# Patient Record
Sex: MALE | Race: WHITE | NOT HISPANIC OR LATINO
[De-identification: names, ages, dates, MRNs, and addresses within clinical notes are randomized per-mention and may not be internally consistent; named-entity substitution may affect disease eponyms.]

---

## 2018-07-16 PROBLEM — M25.561 CHRONIC PAIN OF RIGHT KNEE: Status: ACTIVE | Noted: 2018-07-16

## 2018-07-17 ENCOUNTER — APPOINTMENT (OUTPATIENT)
Dept: ORTHOPEDIC SURGERY | Facility: CLINIC | Age: 58
End: 2018-07-17
Payer: MEDICARE

## 2018-07-17 VITALS
DIASTOLIC BLOOD PRESSURE: 61 MMHG | SYSTOLIC BLOOD PRESSURE: 138 MMHG | HEART RATE: 106 BPM | BODY MASS INDEX: 28.63 KG/M2 | HEIGHT: 70 IN | WEIGHT: 200 LBS

## 2018-07-17 DIAGNOSIS — Z85.9 PERSONAL HISTORY OF MALIGNANT NEOPLASM, UNSPECIFIED: ICD-10-CM

## 2018-07-17 DIAGNOSIS — Z78.9 OTHER SPECIFIED HEALTH STATUS: ICD-10-CM

## 2018-07-17 DIAGNOSIS — M25.561 PAIN IN RIGHT KNEE: ICD-10-CM

## 2018-07-17 DIAGNOSIS — T84.84XA PAIN DUE TO INTERNAL ORTHOPEDIC PROSTHETIC DEVICES, IMPLANTS AND GRAFTS, INITIAL ENCOUNTER: ICD-10-CM

## 2018-07-17 DIAGNOSIS — Z86.69 PERSONAL HISTORY OF OTHER DISEASES OF THE NERVOUS SYSTEM AND SENSE ORGANS: ICD-10-CM

## 2018-07-17 DIAGNOSIS — M25.551 PAIN IN RIGHT HIP: ICD-10-CM

## 2018-07-17 DIAGNOSIS — G89.29 PAIN IN RIGHT KNEE: ICD-10-CM

## 2018-07-17 DIAGNOSIS — Z87.891 PERSONAL HISTORY OF NICOTINE DEPENDENCE: ICD-10-CM

## 2018-07-17 DIAGNOSIS — Z80.9 FAMILY HISTORY OF MALIGNANT NEOPLASM, UNSPECIFIED: ICD-10-CM

## 2018-07-17 DIAGNOSIS — Z86.73 PERSONAL HISTORY OF TRANSIENT ISCHEMIC ATTACK (TIA), AND CEREBRAL INFARCTION W/OUT RESIDUAL DEFICITS: ICD-10-CM

## 2018-07-17 DIAGNOSIS — Z82.61 FAMILY HISTORY OF ARTHRITIS: ICD-10-CM

## 2018-07-17 DIAGNOSIS — Z87.39 PERSONAL HISTORY OF OTHER DISEASES OF THE MUSCULOSKELETAL SYSTEM AND CONNECTIVE TISSUE: ICD-10-CM

## 2018-07-17 DIAGNOSIS — Z96.651 PAIN DUE TO INTERNAL ORTHOPEDIC PROSTHETIC DEVICES, IMPLANTS AND GRAFTS, INITIAL ENCOUNTER: ICD-10-CM

## 2018-07-17 DIAGNOSIS — Z86.39 PERSONAL HISTORY OF OTHER ENDOCRINE, NUTRITIONAL AND METABOLIC DISEASE: ICD-10-CM

## 2018-07-17 PROCEDURE — 73502 X-RAY EXAM HIP UNI 2-3 VIEWS: CPT | Mod: RT

## 2018-07-17 PROCEDURE — 73564 X-RAY EXAM KNEE 4 OR MORE: CPT | Mod: RT

## 2018-07-17 PROCEDURE — 99204 OFFICE O/P NEW MOD 45 MIN: CPT

## 2018-07-17 RX ORDER — OXYCODONE HYDROCHLORIDE AND ACETAMINOPHEN 10; 325 MG/1; MG/1
10-325 TABLET ORAL
Refills: 0 | Status: ACTIVE | COMMUNITY

## 2018-07-17 RX ORDER — INSULIN GLARGINE 100 [IU]/ML
INJECTION, SOLUTION SUBCUTANEOUS
Refills: 0 | Status: ACTIVE | COMMUNITY

## 2018-07-30 ENCOUNTER — CHART COPY (OUTPATIENT)
Age: 58
End: 2018-07-30

## 2018-07-30 ENCOUNTER — APPOINTMENT (OUTPATIENT)
Dept: ORTHOPEDIC SURGERY | Facility: CLINIC | Age: 58
End: 2018-07-30

## 2018-07-30 LAB
CRP SERPL-MCNC: 0.17 MG/DL
ERYTHROCYTE [SEDIMENTATION RATE] IN BLOOD BY WESTERGREN METHOD: 13 MM/HR

## 2018-12-08 ENCOUNTER — EMERGENCY (EMERGENCY)
Facility: HOSPITAL | Age: 58
LOS: 1 days | Discharge: HOME | End: 2018-12-08
Attending: EMERGENCY MEDICINE
Payer: MEDICARE

## 2018-12-08 VITALS
SYSTOLIC BLOOD PRESSURE: 133 MMHG | RESPIRATION RATE: 20 BRPM | DIASTOLIC BLOOD PRESSURE: 78 MMHG | OXYGEN SATURATION: 98 % | HEART RATE: 76 BPM | TEMPERATURE: 98 F

## 2018-12-08 VITALS
DIASTOLIC BLOOD PRESSURE: 82 MMHG | HEART RATE: 86 BPM | OXYGEN SATURATION: 98 % | RESPIRATION RATE: 18 BRPM | SYSTOLIC BLOOD PRESSURE: 149 MMHG | TEMPERATURE: 98 F

## 2018-12-08 DIAGNOSIS — M22.3X1 OTHER DERANGEMENTS OF PATELLA, RIGHT KNEE: Chronic | ICD-10-CM

## 2018-12-08 PROCEDURE — 73562 X-RAY EXAM OF KNEE 3: CPT | Mod: 26,RT

## 2018-12-08 PROCEDURE — 73562 X-RAY EXAM OF KNEE 3: CPT

## 2018-12-08 PROCEDURE — 73600 X-RAY EXAM OF ANKLE: CPT | Mod: 26,RT

## 2018-12-08 PROCEDURE — 73610 X-RAY EXAM OF ANKLE: CPT | Mod: 26,RT

## 2018-12-08 PROCEDURE — 99284 EMERGENCY DEPT VISIT MOD MDM: CPT

## 2018-12-08 PROCEDURE — 73610 X-RAY EXAM OF ANKLE: CPT

## 2018-12-08 PROCEDURE — 96372 THER/PROPH/DIAG INJ SC/IM: CPT

## 2018-12-08 PROCEDURE — 99284 EMERGENCY DEPT VISIT MOD MDM: CPT | Mod: 25

## 2018-12-08 PROCEDURE — 73590 X-RAY EXAM OF LOWER LEG: CPT | Mod: 26,RT

## 2018-12-08 PROCEDURE — 73590 X-RAY EXAM OF LOWER LEG: CPT

## 2018-12-08 PROCEDURE — 73600 X-RAY EXAM OF ANKLE: CPT

## 2018-12-08 RX ORDER — OXYCODONE AND ACETAMINOPHEN 5; 325 MG/1; MG/1
1 TABLET ORAL ONCE
Qty: 0 | Refills: 0 | Status: DISCONTINUED | OUTPATIENT
Start: 2018-12-08 | End: 2018-12-08

## 2018-12-08 RX ORDER — BACLOFEN 100 %
1 POWDER (GRAM) MISCELLANEOUS
Qty: 0 | Refills: 0 | COMMUNITY

## 2018-12-08 RX ORDER — HYDROMORPHONE HYDROCHLORIDE 2 MG/ML
1 INJECTION INTRAMUSCULAR; INTRAVENOUS; SUBCUTANEOUS ONCE
Qty: 0 | Refills: 0 | Status: DISCONTINUED | OUTPATIENT
Start: 2018-12-08 | End: 2018-12-08

## 2018-12-08 RX ORDER — NATALIZUMAB 300 MG/15ML
0 INJECTION INTRAVENOUS
Qty: 0 | Refills: 0 | COMMUNITY

## 2018-12-08 RX ORDER — ALPRAZOLAM 0.25 MG
1 TABLET ORAL
Qty: 0 | Refills: 0 | COMMUNITY

## 2018-12-08 RX ORDER — ZOLPIDEM TARTRATE 10 MG/1
10 TABLET ORAL
Qty: 0 | Refills: 0 | COMMUNITY

## 2018-12-08 RX ORDER — DIPHENHYDRAMINE HCL 50 MG
0 CAPSULE ORAL
Qty: 0 | Refills: 0 | COMMUNITY

## 2018-12-08 RX ADMIN — OXYCODONE AND ACETAMINOPHEN 1 TABLET(S): 5; 325 TABLET ORAL at 11:36

## 2018-12-08 RX ADMIN — HYDROMORPHONE HYDROCHLORIDE 1 MILLIGRAM(S): 2 INJECTION INTRAMUSCULAR; INTRAVENOUS; SUBCUTANEOUS at 11:36

## 2018-12-08 RX ADMIN — OXYCODONE AND ACETAMINOPHEN 1 TABLET(S): 5; 325 TABLET ORAL at 11:37

## 2018-12-08 NOTE — ED PROVIDER NOTE - NEUROLOGICAL, MLM
Alert and oriented, no focal deficits, no motor or sensory deficits. RLE with chronic decreased but intact sensation, unchanged from baseline.

## 2018-12-08 NOTE — ED ADULT TRIAGE NOTE - CHIEF COMPLAINT QUOTE
Pt BIBA, c/o right knee pain s/p fall. Reports he missed 1 step and fell on his right knee, pt reports 4 previous right knee replacements and a knee cap replacement. Pt unable to ambulate, uses walker at home. Denies hitting head, LOC or blood thinners.

## 2018-12-08 NOTE — ED ADULT NURSE REASSESSMENT NOTE - NS ED NURSE REASSESS COMMENT FT1
pt tolerated po fluids and ate a meal without difficulty. pt states that he is ready to go home. Dr Bledsoe at the bedside spoke with patient and family about x ray results and follow up. pt was able to stand briefly. pt discharged via wheel cjair

## 2018-12-08 NOTE — ED PROVIDER NOTE - ATTENDING CONTRIBUTION TO CARE
The patient seen and examined.  The patient presents with right knee pain after a mechanical fall  The patient walks minimally with walker and on wheelchair most of the time    Knee contusion    I, Melquiades Bledsoe, performed the initial face to face bedside interview with this patient regarding history of present illness, review of symptoms and relevant past medical, social and family history.  I completed an independent physical examination.  I was the initial provider who evaluated this patient. I have signed out the follow up of any pending tests (i.e. labs, radiological studies) to the ACP.  I have communicated the patient’s plan of care and disposition with the ACP.

## 2018-12-08 NOTE — ED PROVIDER NOTE - OBJECTIVE STATEMENT
58M with PMHx DM, HTN, MS, R knee replacement x 3, legally blind presents for R knee pain s/p mechanical fall this morning. Patient reports he was leaving his doctor's appointment this morning at around 0845 when he missed a step and fell onto his right side. Patient denies hitting his head, LOC, blood thinner use. Patient states his right knee, leg and ankle are in pain. Denies fever/chills, chest pain, shortness of breath, abdominal pain, nausea/vomiting, leg swelling, numbness/tingling, weakness.

## 2018-12-08 NOTE — ED ADULT NURSE NOTE - PSH
History of Appendectomy    History of Cataract Extraction    History of Cholecystectomy    History of Laminectomy    History of Total Knee Replacement  right x 4  Personal History of Gastric Bypass    Slipped patella of right knee

## 2018-12-08 NOTE — ED ADULT NURSE NOTE - PMH
CVA (cerebral infarction)    Diabetes    History of Multiple Sclerosis    Legally blind    Pneumonia  nov 2010, fungal, required intubation and thoracotomy  Syncope and Collapse  frequent syncopal episodes

## 2018-12-08 NOTE — ED ADULT NURSE NOTE - OBJECTIVE STATEMENT
pt states that he was leaving a doctors office and fell onto his right knee and right side. pt denies hitting his head. pt c/o pain to the right knee and ankle pt has has a old right knee wound which he opened with fall.

## 2018-12-08 NOTE — ED ADULT NURSE NOTE - NSIMPLEMENTINTERV_GEN_ALL_ED
Implemented All Fall with Harm Risk Interventions:  [place] to call system. Call bell, personal items and telephone within reach. Instruct patient to call for assistance. Room bathroom lighting operational. Non-slip footwear when patient is off stretcher. Physically safe environment: no spills, clutter or unnecessary equipment. Stretcher in lowest position, wheels locked, appropriate side rails in place. Provide visual cue, wrist band, yellow gown, etc. Monitor gait and stability. Monitor for mental status changes and reorient to person, place, and time. Review medications for side effects contributing to fall risk. Reinforce activity limits and safety measures with patient and family. Provide visual clues: red socks.

## 2019-07-16 PROBLEM — E11.9 TYPE 2 DIABETES MELLITUS WITHOUT COMPLICATIONS: Chronic | Status: ACTIVE | Noted: 2018-12-08

## 2019-07-16 PROBLEM — H54.8 LEGAL BLINDNESS, AS DEFINED IN USA: Chronic | Status: ACTIVE | Noted: 2018-12-08

## 2019-07-17 ENCOUNTER — APPOINTMENT (OUTPATIENT)
Dept: ORTHOPEDIC SURGERY | Facility: CLINIC | Age: 59
End: 2019-07-17

## 2021-09-02 ENCOUNTER — APPOINTMENT (OUTPATIENT)
Dept: CARDIOLOGY | Facility: CLINIC | Age: 61
End: 2021-09-02
Payer: MEDICARE

## 2021-09-02 ENCOUNTER — NON-APPOINTMENT (OUTPATIENT)
Age: 61
End: 2021-09-02

## 2021-09-02 VITALS — SYSTOLIC BLOOD PRESSURE: 104 MMHG | DIASTOLIC BLOOD PRESSURE: 54 MMHG

## 2021-09-02 VITALS
SYSTOLIC BLOOD PRESSURE: 100 MMHG | OXYGEN SATURATION: 97 % | HEART RATE: 87 BPM | DIASTOLIC BLOOD PRESSURE: 58 MMHG | WEIGHT: 187 LBS | TEMPERATURE: 96.7 F | HEIGHT: 70 IN | RESPIRATION RATE: 16 BRPM | BODY MASS INDEX: 26.77 KG/M2

## 2021-09-02 DIAGNOSIS — Z80.8 FAMILY HISTORY OF MALIGNANT NEOPLASM OF OTHER ORGANS OR SYSTEMS: ICD-10-CM

## 2021-09-02 DIAGNOSIS — M54.2 CERVICALGIA: ICD-10-CM

## 2021-09-02 DIAGNOSIS — Z78.9 OTHER SPECIFIED HEALTH STATUS: ICD-10-CM

## 2021-09-02 DIAGNOSIS — G35 MULTIPLE SCLEROSIS: ICD-10-CM

## 2021-09-02 DIAGNOSIS — K76.0 FATTY (CHANGE OF) LIVER, NOT ELSEWHERE CLASSIFIED: ICD-10-CM

## 2021-09-02 DIAGNOSIS — Z83.3 FAMILY HISTORY OF DIABETES MELLITUS: ICD-10-CM

## 2021-09-02 DIAGNOSIS — Z99.3 DEPENDENCE ON WHEELCHAIR: ICD-10-CM

## 2021-09-02 DIAGNOSIS — H54.8 LEGAL BLINDNESS, AS DEFINED IN USA: ICD-10-CM

## 2021-09-02 DIAGNOSIS — R10.9 UNSPECIFIED ABDOMINAL PAIN: ICD-10-CM

## 2021-09-02 DIAGNOSIS — Z86.69 PERSONAL HISTORY OF OTHER DISEASES OF THE NERVOUS SYSTEM AND SENSE ORGANS: ICD-10-CM

## 2021-09-02 DIAGNOSIS — J18.9 PNEUMONIA, UNSPECIFIED ORGANISM: ICD-10-CM

## 2021-09-02 DIAGNOSIS — E78.1 PURE HYPERGLYCERIDEMIA: ICD-10-CM

## 2021-09-02 DIAGNOSIS — K86.89 OTHER SPECIFIED DISEASES OF PANCREAS: ICD-10-CM

## 2021-09-02 DIAGNOSIS — E11.9 TYPE 2 DIABETES MELLITUS W/OUT COMPLICATIONS: ICD-10-CM

## 2021-09-02 PROCEDURE — 93000 ELECTROCARDIOGRAM COMPLETE: CPT

## 2021-09-02 PROCEDURE — 99204 OFFICE O/P NEW MOD 45 MIN: CPT | Mod: 25

## 2021-09-02 RX ORDER — EMPAGLIFLOZIN 10 MG/1
10 TABLET, FILM COATED ORAL
Refills: 0 | Status: DISCONTINUED | COMMUNITY
End: 2021-09-02

## 2021-09-02 NOTE — PHYSICAL EXAM
[Normal] : clear lung fields, good air entry, no respiratory distress [No Edema] : no edema [No Cyanosis] : no cyanosis [No Clubbing] : no clubbing [Moves all extremities] : moves all extremities [Normal Speech] : normal speech [de-identified] : No JVD, no carotid artery bruits auscultated bilaterally [de-identified] : wheelchair

## 2021-09-02 NOTE — HISTORY OF PRESENT ILLNESS
[FreeTextEntry1] : 61 year old male, history of DM, MS, neuropathy, carotid artery disease, CAD, ischemic CVAs, syncope s/p ILR many years ago (battery now dead) and ILR is retained, presents to establish care with cardiology. Patient has no chest pain or SOB, however not very active. He had a recent PET scan which showed coronary artery disease. Imaging of his brain showed multiple old ischemic infarcts and CTA of the carotid arteries showed non-obstructive disease. \par \par There is no clinical history of MI, previous coronary intervention.

## 2021-09-02 NOTE — DISCUSSION/SUMMARY
[FreeTextEntry1] : 1. Coronary Artery Disease: found incidentally on PET scan. Discussed the possibility of silent ischemia given his neuropathy and diabetes. Patient adamant that he doesn't want nuclear stress testing. He has a iodine allergy (reports anaphylaxis, although CTA of the head and neck done at [facility] was done with iodine). At this point in time, recommend starting Aspirin 81mg daily and atorvastatin 20mg daily.\par \par 2. Carotid Artery Disease: non-obstructive on recent CTA of the head neck. Aspirin and statin indicated. \par \par 3. Hx of CVAs: medical therapy as above. \par \par 4. History of Syncope: ILR placed many years ago by Dr. Hadley. ILR battery now depleted. No events on ILR reported as per patient and wife. Syncope thought to be due more to MS. Neurology follow up. \par \par Follow up in 6 months.

## 2021-09-02 NOTE — REVIEW OF SYSTEMS
[Fever] : no fever [Chills] : no chills [Feeling Fatigued] : feeling fatigued [Joint Pain] : joint pain [Joint Stiffness] : joint stiffness [Myalgia] : myalgia [Negative] : Gastrointestinal [FreeTextEntry5] : see HPI [FreeTextEntry6] : see HPI [de-identified] : see HPI

## 2021-09-02 NOTE — CARDIOLOGY SUMMARY
[de-identified] : 09/2/2021, NSR with PVCs, low voltages and non-specific T wave changes. [de-identified] : 6/4/2021, LV EF 65%, no significant valvular disease.

## 2022-01-21 ENCOUNTER — APPOINTMENT (OUTPATIENT)
Dept: ENDOCRINOLOGY | Facility: CLINIC | Age: 62
End: 2022-01-21

## 2022-02-09 DIAGNOSIS — M25.551 PAIN IN RIGHT HIP: ICD-10-CM

## 2022-02-15 ENCOUNTER — APPOINTMENT (OUTPATIENT)
Dept: ORTHOPEDIC SURGERY | Facility: CLINIC | Age: 62
End: 2022-02-15

## 2022-03-02 ENCOUNTER — NON-APPOINTMENT (OUTPATIENT)
Age: 62
End: 2022-03-02

## 2022-03-02 ENCOUNTER — APPOINTMENT (OUTPATIENT)
Dept: CARDIOLOGY | Facility: CLINIC | Age: 62
End: 2022-03-02
Payer: MEDICARE

## 2022-03-02 VITALS
SYSTOLIC BLOOD PRESSURE: 102 MMHG | DIASTOLIC BLOOD PRESSURE: 56 MMHG | OXYGEN SATURATION: 95 % | HEIGHT: 70 IN | HEART RATE: 80 BPM

## 2022-03-02 PROCEDURE — 99214 OFFICE O/P EST MOD 30 MIN: CPT | Mod: 25

## 2022-03-02 PROCEDURE — 93000 ELECTROCARDIOGRAM COMPLETE: CPT

## 2022-03-02 RX ORDER — ASPIRIN ENTERIC COATED TABLETS 81 MG 81 MG/1
81 TABLET, DELAYED RELEASE ORAL
Refills: 0 | Status: ACTIVE | COMMUNITY
Start: 2022-03-02

## 2022-03-02 RX ORDER — ASPIRIN ENTERIC COATED TABLETS 81 MG 81 MG/1
81 TABLET, DELAYED RELEASE ORAL
Refills: 0 | Status: DISCONTINUED | COMMUNITY
Start: 2021-09-02 | End: 2022-03-02

## 2022-03-02 RX ORDER — ATORVASTATIN CALCIUM 20 MG/1
20 TABLET, FILM COATED ORAL
Qty: 90 | Refills: 3 | Status: DISCONTINUED | COMMUNITY
Start: 2021-09-02 | End: 2022-03-02

## 2022-03-02 NOTE — CARDIOLOGY SUMMARY
[de-identified] : 03/02/2022, NSR with PVCs, low voltages and non-specific T wave changes. [de-identified] : 6/4/2021, LV EF 65%, no significant valvular disease.

## 2022-03-02 NOTE — REVIEW OF SYSTEMS
[Fever] : no fever [Chills] : no chills [Feeling Fatigued] : feeling fatigued [Joint Pain] : joint pain [Joint Stiffness] : joint stiffness [Myalgia] : myalgia [Negative] : Gastrointestinal [FreeTextEntry5] : see HPI [FreeTextEntry6] : see HPI [de-identified] : see HPI

## 2022-03-02 NOTE — PHYSICAL EXAM
[Normal] : clear lung fields, good air entry, no respiratory distress [No Edema] : no edema [No Cyanosis] : no cyanosis [No Clubbing] : no clubbing [Moves all extremities] : moves all extremities [Normal Speech] : normal speech [de-identified] : no carotid artery bruits auscultated bilaterally [de-identified] : wheelchair

## 2022-03-02 NOTE — DISCUSSION/SUMMARY
[FreeTextEntry1] : 1. Coronary Artery Disease: found incidentally on PET scan. Discussed the possibility of silent ischemia given his neuropathy and diabetes. Patient adamant that he doesn't want nuclear stress testing. He has a iodine allergy (reports anaphylaxis, although CTA of the head and neck done at [facility] was done with iodine). At this point in time, recommend restarting Aspirin 81mg daily and atorvastatin 20mg daily.\par \par 2. Carotid Artery Disease: non-obstructive on recent CTA of the head neck. Aspirin and statin indicated. \par \par 3. Hx of CVAs: medical therapy as above. \par \par 4. History of Syncope: ILR placed many years ago by Dr. Hadley. ILR battery now depleted. No events on ILR reported as per patient and wife. Syncope thought to be due more to MS. Neurology follow up. \par \par Follow up in 6 months.

## 2022-09-16 ENCOUNTER — NON-APPOINTMENT (OUTPATIENT)
Age: 62
End: 2022-09-16

## 2022-09-16 ENCOUNTER — APPOINTMENT (OUTPATIENT)
Dept: CARDIOLOGY | Facility: CLINIC | Age: 62
End: 2022-09-16

## 2022-09-16 VITALS
SYSTOLIC BLOOD PRESSURE: 100 MMHG | HEART RATE: 93 BPM | OXYGEN SATURATION: 95 % | HEIGHT: 70 IN | DIASTOLIC BLOOD PRESSURE: 58 MMHG

## 2022-09-16 PROCEDURE — 99214 OFFICE O/P EST MOD 30 MIN: CPT | Mod: 25

## 2022-09-16 PROCEDURE — 93000 ELECTROCARDIOGRAM COMPLETE: CPT

## 2022-09-16 RX ORDER — INSULIN LISPRO 100 [IU]/ML
100 INJECTION, SOLUTION INTRAVENOUS; SUBCUTANEOUS
Refills: 0 | Status: ACTIVE | COMMUNITY

## 2022-09-16 NOTE — CARDIOLOGY SUMMARY
[de-identified] : 9/16/2022, NSR, low voltages [de-identified] : 6/4/2021, LV EF 65%, no significant valvular disease.

## 2022-09-16 NOTE — PHYSICAL EXAM
[Normal] : no edema, no cyanosis, no clubbing, no varicosities [Moves all extremities] : moves all extremities [Normal Speech] : normal speech [de-identified] : no carotid artery bruits auscultated bilaterally [de-identified] : wheelchair

## 2022-09-16 NOTE — HISTORY OF PRESENT ILLNESS
[FreeTextEntry1] : Historical Perspective:\par 61 year old male, history of DM, MS, neuropathy, carotid artery disease, CAD, ischemic CVAs, syncope s/p ILR many years ago (battery now dead) and ILR is retained, presents to establish care with cardiology. Patient has no chest pain or SOB, however not very active. He had a recent PET scan which showed coronary artery disease. Imaging of his brain showed multiple old ischemic infarcts and CTA of the carotid arteries showed non-obstructive disease. \par \par There is no clinical history of MI, previous coronary intervention.\par \par Current Health Status:\par Patient with no chest pain, SOB, or palpitations. No hospitalizations since seeing me last. Remains compliant with his medications and reports no adverse effects. Patient complaining of dysphagia.

## 2022-09-16 NOTE — DISCUSSION/SUMMARY
[FreeTextEntry1] : 1. Coronary Artery Disease: found incidentally on PET scan. Discussed the possibility of silent ischemia given his neuropathy and diabetes. Patient adamant that he doesn't want nuclear stress testing. He has a iodine allergy (reports anaphylaxis, although CTA of the head and neck done at [facility] was done with iodine). At this point in time, recommend restarting Aspirin 81mg daily and atorvastatin 20mg daily.\par \par 2. Carotid Artery Disease: non-obstructive on recent CTA of the head neck. Aspirin and statin indicated. \par \par 3. Hx of CVAs: medical therapy as above. \par \par 4. History of Syncope: ILR placed many years ago by Dr. Hadley. ILR battery now depleted. No events on ILR reported as per patient and wife. Syncope thought to be due more to MS. Neurology follow up. \par \par Provided patient with GI, Dr. Arlette Carrizales's office information. \par \par Follow up in 12 months. [EKG obtained to assist in diagnosis and management of assessed problem(s)] : EKG obtained to assist in diagnosis and management of assessed problem(s)

## 2022-09-16 NOTE — REVIEW OF SYSTEMS
[Fever] : no fever [Chills] : no chills [Feeling Fatigued] : feeling fatigued [Joint Pain] : joint pain [Joint Stiffness] : joint stiffness [Myalgia] : myalgia [Negative] : Gastrointestinal [FreeTextEntry5] : see HPI [FreeTextEntry6] : see HPI [de-identified] : see HPI

## 2022-09-30 ENCOUNTER — TRANSCRIPTION ENCOUNTER (OUTPATIENT)
Age: 62
End: 2022-09-30

## 2022-10-17 ENCOUNTER — APPOINTMENT (OUTPATIENT)
Dept: CARDIOLOGY | Facility: CLINIC | Age: 62
End: 2022-10-17

## 2022-10-17 ENCOUNTER — NON-APPOINTMENT (OUTPATIENT)
Age: 62
End: 2022-10-17

## 2022-10-17 VITALS
WEIGHT: 173 LBS | SYSTOLIC BLOOD PRESSURE: 102 MMHG | BODY MASS INDEX: 24.77 KG/M2 | OXYGEN SATURATION: 95 % | HEART RATE: 82 BPM | HEIGHT: 70 IN | TEMPERATURE: 97.1 F | DIASTOLIC BLOOD PRESSURE: 60 MMHG

## 2022-10-17 PROCEDURE — 99214 OFFICE O/P EST MOD 30 MIN: CPT

## 2022-10-17 RX ORDER — ALPRAZOLAM 0.25 MG/1
0.25 TABLET ORAL
Refills: 0 | Status: DISCONTINUED | COMMUNITY
End: 2022-10-17

## 2022-10-17 RX ORDER — ZOLPIDEM TARTRATE 5 MG/1
5 TABLET, FILM COATED ORAL
Refills: 0 | Status: DISCONTINUED | COMMUNITY
End: 2022-10-17

## 2022-10-17 RX ORDER — ALPRAZOLAM 1 MG/1
1 TABLET ORAL
Qty: 120 | Refills: 0 | Status: ACTIVE | COMMUNITY
Start: 2022-10-07

## 2022-10-17 RX ORDER — ZOLPIDEM TARTRATE 10 MG/1
10 TABLET ORAL
Qty: 30 | Refills: 0 | Status: ACTIVE | COMMUNITY
Start: 2022-09-30

## 2022-10-19 ENCOUNTER — NON-APPOINTMENT (OUTPATIENT)
Age: 62
End: 2022-10-19

## 2022-12-06 DIAGNOSIS — Z00.00 ENCOUNTER FOR GENERAL ADULT MEDICAL EXAMINATION W/OUT ABNORMAL FINDINGS: ICD-10-CM

## 2022-12-07 ENCOUNTER — APPOINTMENT (OUTPATIENT)
Dept: ORTHOPEDIC SURGERY | Facility: CLINIC | Age: 62
End: 2022-12-07

## 2023-02-09 ENCOUNTER — APPOINTMENT (OUTPATIENT)
Dept: CARDIOLOGY | Facility: CLINIC | Age: 63
End: 2023-02-09
Payer: MEDICARE

## 2023-02-09 PROCEDURE — 93248 EXT ECG>7D<15D REV&INTERPJ: CPT

## 2023-04-18 ENCOUNTER — APPOINTMENT (OUTPATIENT)
Dept: CARDIOLOGY | Facility: CLINIC | Age: 63
End: 2023-04-18
Payer: MEDICARE

## 2023-04-18 VITALS
BODY MASS INDEX: 23.49 KG/M2 | HEIGHT: 68 IN | DIASTOLIC BLOOD PRESSURE: 54 MMHG | OXYGEN SATURATION: 97 % | HEART RATE: 86 BPM | WEIGHT: 155 LBS | SYSTOLIC BLOOD PRESSURE: 96 MMHG

## 2023-04-18 DIAGNOSIS — R07.89 OTHER CHEST PAIN: ICD-10-CM

## 2023-04-18 DIAGNOSIS — Z87.898 PERSONAL HISTORY OF OTHER SPECIFIED CONDITIONS: ICD-10-CM

## 2023-04-18 PROCEDURE — 99214 OFFICE O/P EST MOD 30 MIN: CPT

## 2023-04-18 NOTE — DISCUSSION/SUMMARY
[FreeTextEntry1] : 1. Coronary Artery Disease: underwent cardiac catheterization, October 2022. Non-obstructive disease. Continue atorvastatin 40mg daily. Better DM control. \par \par 2. Carotid Artery Disease: non-obstructive on recent CTA of the head neck. Continue atorvastatin 40mg daily.  \par \par 3. Hx of CVAs: medical therapy as above. \par \par 4. History of Syncope: ILR placed many years ago by Dr. Hadley. ILR battery now depleted. No events on ILR reported as per patient and wife. Syncope thought to be due more to MS. Neurology follow up. Wants ILR removed because having issues getting MRIs done. Will refer to EP for removal. \par \par Follow up in September 2023.

## 2023-04-18 NOTE — CARDIOLOGY SUMMARY
[de-identified] : 9/16/2022, NSR, low voltages [de-identified] : 6/4/2021, LV EF 65%, no significant valvular disease. [de-identified] : 10/13/2022, 50% mid LAD, 40% distal LAD, 40% OM1 (no interventions)

## 2023-04-18 NOTE — HISTORY OF PRESENT ILLNESS
[FreeTextEntry1] : Historical Perspective:\par 63 year old male, history of DM, MS, neuropathy, carotid artery disease, CAD, ischemic CVAs, syncope s/p ILR many years ago (battery now dead) and ILR is retained, presents to establish care with cardiology. Patient has no chest pain or SOB, however not very active. He had a recent PET scan which showed coronary artery disease. Imaging of his brain showed multiple old ischemic infarcts and CTA of the carotid arteries showed non-obstructive disease. \par \par There is no clinical history of MI, previous coronary intervention.\par \par Current Health Status:\par Recent hospitalization at [facility] for MS flair. Following with neurology. Wants ILR removed because having issues with MRIs. Also pending insulin pump.

## 2023-04-18 NOTE — REVIEW OF SYSTEMS
[Fever] : no fever [Chills] : no chills [Joint Pain] : joint pain [Feeling Fatigued] : feeling fatigued [Joint Stiffness] : joint stiffness [Myalgia] : myalgia [Negative] : Gastrointestinal [FreeTextEntry5] : see HPI [FreeTextEntry6] : see HPI [de-identified] : see HPI

## 2023-05-05 ENCOUNTER — NON-APPOINTMENT (OUTPATIENT)
Age: 63
End: 2023-05-05

## 2023-05-17 ENCOUNTER — APPOINTMENT (OUTPATIENT)
Dept: CARDIOLOGY | Facility: CLINIC | Age: 63
End: 2023-05-17

## 2023-09-13 ENCOUNTER — APPOINTMENT (OUTPATIENT)
Dept: CARDIOLOGY | Facility: CLINIC | Age: 63
End: 2023-09-13

## 2023-12-08 RX ORDER — ATORVASTATIN CALCIUM 40 MG/1
40 TABLET, FILM COATED ORAL
Qty: 90 | Refills: 3 | Status: ACTIVE | COMMUNITY
Start: 2022-03-02 | End: 1900-01-01

## 2023-12-08 RX ORDER — DOXYCYCLINE 100 MG/1
100 CAPSULE ORAL
Refills: 0 | Status: DISCONTINUED | COMMUNITY
End: 2023-12-08

## 2023-12-22 ENCOUNTER — APPOINTMENT (OUTPATIENT)
Dept: CARDIOLOGY | Facility: CLINIC | Age: 63
End: 2023-12-22
Payer: MEDICARE

## 2023-12-22 VITALS
HEART RATE: 85 BPM | BODY MASS INDEX: 22.28 KG/M2 | OXYGEN SATURATION: 97 % | DIASTOLIC BLOOD PRESSURE: 56 MMHG | SYSTOLIC BLOOD PRESSURE: 110 MMHG | HEIGHT: 68 IN | WEIGHT: 147 LBS

## 2023-12-22 DIAGNOSIS — I25.10 ATHEROSCLEROTIC HEART DISEASE OF NATIVE CORONARY ARTERY W/OUT ANGINA PECTORIS: ICD-10-CM

## 2023-12-22 DIAGNOSIS — I63.9 CEREBRAL INFARCTION, UNSPECIFIED: ICD-10-CM

## 2023-12-22 DIAGNOSIS — I77.9 DISORDER OF ARTERIES AND ARTERIOLES, UNSPECIFIED: ICD-10-CM

## 2023-12-22 PROCEDURE — 99214 OFFICE O/P EST MOD 30 MIN: CPT

## 2023-12-22 NOTE — CARDIOLOGY SUMMARY
[de-identified] : 9/16/2022, NSR, low voltages [de-identified] : 6/4/2021, LV EF 65%, no significant valvular disease. [de-identified] : 10/13/2022, 50% mid LAD, 40% distal LAD, 40% OM1 (no interventions)

## 2023-12-22 NOTE — PHYSICAL EXAM
[Normal] : clear lung fields, good air entry, no respiratory distress [Moves all extremities] : moves all extremities [Normal Speech] : normal speech [de-identified] : no carotid artery bruits auscultated bilaterally [de-identified] : wheelchair [de-identified] : above the knee amputaton of right lower extremity.

## 2023-12-22 NOTE — DISCUSSION/SUMMARY
[FreeTextEntry1] : 1. Coronary Artery Disease: underwent cardiac catheterization, October 2022. Non-obstructive disease. Continue atorvastatin 40mg daily. Better DM control.   2. Carotid Artery Disease: non-obstructive on recent CTA of the head neck. Continue atorvastatin 40mg daily.    3. Hx of CVAs: medical therapy as above.   4. History of Syncope: ILR placed many years ago by Dr. Hadley. ILR battery now depleted. No events on ILR reported as per patient and wife. Syncope thought to be due more to MS. Neurology follow up. LR explanted  Follow up in 1 year.

## 2023-12-22 NOTE — HISTORY OF PRESENT ILLNESS
[FreeTextEntry1] : Historical Perspective: 63 year old male, history of DM, MS, neuropathy, carotid artery disease, CAD, ischemic CVAs, syncope s/p ILR many years ago (battery now dead) and ILR is retained, presents to establish care with cardiology. Patient has no chest pain or SOB, however not very active. He had a recent PET scan which showed coronary artery disease. Imaging of his brain showed multiple old ischemic infarcts and CTA of the carotid arteries showed non-obstructive disease.   There is no clinical history of MI, previous coronary intervention.  Current Health Status: Recent hospitalization at [facility] for unresponsive episode at home. No significant findings during hospitalization on echocardiogram or MRI of brain. Patient also on above the knee amputation of right lower extremity, 7/2023, because of a MRSA infection.

## 2024-07-03 ENCOUNTER — APPOINTMENT (OUTPATIENT)
Dept: CARDIOLOGY | Facility: CLINIC | Age: 64
End: 2024-07-03
Payer: MEDICARE

## 2024-07-03 ENCOUNTER — NON-APPOINTMENT (OUTPATIENT)
Age: 64
End: 2024-07-03

## 2024-07-03 VITALS
BODY MASS INDEX: 19.4 KG/M2 | WEIGHT: 128 LBS | HEIGHT: 68 IN | DIASTOLIC BLOOD PRESSURE: 54 MMHG | HEART RATE: 90 BPM | OXYGEN SATURATION: 97 % | SYSTOLIC BLOOD PRESSURE: 90 MMHG

## 2024-07-03 DIAGNOSIS — I25.10 ATHEROSCLEROTIC HEART DISEASE OF NATIVE CORONARY ARTERY W/OUT ANGINA PECTORIS: ICD-10-CM

## 2024-07-03 DIAGNOSIS — R55 SYNCOPE AND COLLAPSE: ICD-10-CM

## 2024-07-03 DIAGNOSIS — I63.9 CEREBRAL INFARCTION, UNSPECIFIED: ICD-10-CM

## 2024-07-03 PROCEDURE — 93000 ELECTROCARDIOGRAM COMPLETE: CPT

## 2024-07-03 PROCEDURE — G2211 COMPLEX E/M VISIT ADD ON: CPT

## 2024-07-03 PROCEDURE — 99214 OFFICE O/P EST MOD 30 MIN: CPT

## 2024-07-03 RX ORDER — TIZANIDINE HYDROCHLORIDE 4 MG/1
4 CAPSULE ORAL 3 TIMES DAILY
Refills: 0 | Status: ACTIVE | COMMUNITY

## 2024-07-03 RX ORDER — NATALIZUMAB 300 MG/15ML
300 INJECTION INTRAVENOUS
Refills: 0 | Status: ACTIVE | COMMUNITY

## 2024-08-15 ENCOUNTER — APPOINTMENT (OUTPATIENT)
Dept: ORTHOPEDIC SURGERY | Facility: CLINIC | Age: 64
End: 2024-08-15

## 2024-11-26 ENCOUNTER — RX RENEWAL (OUTPATIENT)
Age: 64
End: 2024-11-26

## 2024-12-12 NOTE — ED ADULT TRIAGE NOTE - PRO INTERPRETER NEED 2
Anesthesia Pre Eval Note    Anesthesia ROS/Med Hx          Neuro/Psych Review:       Positive for psychiatric history    Cardiovascular Review:   Exercise tolerance: good (>4 METS)    GI/HEPATIC/RENAL Review:     Positive for GERD  Positive for renal disease  Additional Results:      ALLERGIES:   -- Bee -- SWELLING   -- Fish-Derived Products   (Food Or Med) -- Nausea & Vomiting   Last Labs        Component                Value               Date/Time                  WBC                      8.1                 01/11/2024 0724            RBC                      5.05                01/11/2024 0724            HGB                      15.1                01/11/2024 0724            HCT                      43.6                01/11/2024 0724            MCV                      86.3                01/11/2024 0724            MCH                      29.9                01/11/2024 0724            MCHC                     34.6                01/11/2024 0724            RDW-CV                   11.6                01/11/2024 0724            Sodium                   141                 01/11/2024 0724            Potassium                4.3                 01/11/2024 0724            Chloride                 102                 01/11/2024 0724            Carbon Dioxide           28                  01/11/2024 0724            Glucose                  80                  01/11/2024 0724            BUN                      19                  01/11/2024 0724            Creatinine               1.28 (H)            01/11/2024 0724            Glomerular Filtrati*     74                  01/11/2024 0724            Calcium                  9.2                 01/11/2024 0724            PLT                      275                 01/11/2024 0724        Past Medical History:  No date: Dyslipidemia  06/21/2023: Generalized anxiety disorder  01/28/2019: GERD (gastroesophageal reflux disease)  No date: Ptosis of right eyelid  Past  Surgical History:  No date: Oral surgery procedure   Prior to Admission medications :  Medication pantoprazole (PROTONIX) 40 MG tablet, Sig Take 1 tablet by mouth in the morning and 1 tablet in the evening., Start Date 9/20/24, End Date , Taking? , Authorizing Provider Estela Ewing, DO    Medication Olive Leaf Extract 250 MG Cap, Sig , Start Date , End Date , Taking? , Authorizing Provider Provider, Outside    Medication Cholecalciferol (Vitamin D) 125 MCG (5000 UT) Cap, Sig , Start Date , End Date , Taking? , Authorizing Provider Provider, Outside    Medication PREBIOTIC PRODUCT PO, Sig , Start Date , End Date , Taking? , Authorizing Provider Provider, Outside    Medication Flaxseed, Linseed, (FLAX SEEDS PO), Sig Take  by mouth daily., Start Date , End Date , Taking? , Authorizing Provider Provider, Outside    Medication Multiple Vitamins-Minerals (MULTI ADULT GUMMIES) Chew Tab, Sig , Start Date , End Date , Taking? , Authorizing Provider Provider, Outside    Medication Probiotic Product (ACIDOPHILUS/GOAT MILK) Cap, Sig , Start Date , End Date , Taking? , Authorizing Provider Provider, Outside         Patient Vitals for the past 24 hrs:   BP Temp Temp src Pulse Resp SpO2 Height Weight   12/12/24 0633 (!) 145/98 35.8 °C (96.5 °F) Temporal 69 16 97 % 5' 7\" (1.702 m) 79.4 kg (175 lb)       Social history reviewed:  Social History     Tobacco Use   Smoking Status Never   Smokeless Tobacco Never        E-Cigarette/Vaping Substances & Devices    E-Cigarette/Vaping Use Never Used        Social History     Substance and Sexual Activity   Alcohol Use Yes    Comment: socially           Relevant Problems   No relevant active problems       Physical Exam     Airway   Mallampati: II  TM Distance: >3 FB  Neck ROM: Full  Neck: Non-tender and Able to place in sniff position  TMJ Mobility: Good    Cardiovascular  Cardiovascular exam normal  Cardio Rhythm: Regular  Cardio Rate: Normal    Head Assessment  Head assessment:  Normocephalic and Atraumatic    General Assessment  General Assessment: Alert and oriented and No acute distress    Dental Exam  Dental exam normal  Patient has:  Denied broken/chipped/loose teeth    Pulmonary Exam  Pulmonary exam normal  Breath sounds clear to auscultation:  Yes  Patient Demonstrates:  Non-labored Breathing    Abdominal Exam  Abdominal exam normal      Anesthesia Plan:    ASA Status: 2  Anesthesia Type: MAC    Induction: Intravenous  Maintenance: TIVA  Premedication: None    Patient does not have an implantable electronic device requiring post procedure programming.     Post-op Pain Management: Per Surgeon      Checklist  Reviewed: NPO Status, Allergies, Medications, Problem list, Past Med History and Patient Summary  Consent/Risks Discussed Statement:  The proposed anesthetic plan, including its risks and benefits, have been discussed with the Patient along with the risks and benefits of alternatives. Questions were encouraged and answered and the patient and/or representative understands and agrees to proceed.        I discussed with the patient (and/or patient's legal representative) the risks and benefits of the proposed anesthesia plan, MAC, which may include services performed by other anesthesia providers.    Alternative anesthesia plans, if available, were reviewed with the patient (and/or patient's legal representative). Discussion has been held with the patient (and/or patient's legal representative) regarding risks of anesthesia, which include Intra-operative Awareness, intra-operative awareness, hypotension, aspiration, sore throat, vomiting, nausea and dental injury and emergent situations that may require change in anesthesia plan.    The patient (and/or patient's legal representative) has indicated understanding, his/her questions have been answered, and he/she wishes to proceed with the planned anesthetic.    Blood Products: Not Anticipated     English

## 2025-02-28 ENCOUNTER — APPOINTMENT (OUTPATIENT)
Dept: CARDIOLOGY | Facility: CLINIC | Age: 65
End: 2025-02-28
Payer: MEDICARE

## 2025-02-28 ENCOUNTER — NON-APPOINTMENT (OUTPATIENT)
Age: 65
End: 2025-02-28

## 2025-02-28 VITALS
HEART RATE: 67 BPM | SYSTOLIC BLOOD PRESSURE: 112 MMHG | OXYGEN SATURATION: 98 % | WEIGHT: 114 LBS | DIASTOLIC BLOOD PRESSURE: 54 MMHG | BODY MASS INDEX: 17.33 KG/M2

## 2025-02-28 DIAGNOSIS — Z87.898 PERSONAL HISTORY OF OTHER SPECIFIED CONDITIONS: ICD-10-CM

## 2025-02-28 DIAGNOSIS — I25.10 ATHEROSCLEROTIC HEART DISEASE OF NATIVE CORONARY ARTERY W/OUT ANGINA PECTORIS: ICD-10-CM

## 2025-02-28 DIAGNOSIS — I63.9 CEREBRAL INFARCTION, UNSPECIFIED: ICD-10-CM

## 2025-02-28 PROCEDURE — 99214 OFFICE O/P EST MOD 30 MIN: CPT

## 2025-02-28 PROCEDURE — G2211 COMPLEX E/M VISIT ADD ON: CPT

## 2025-04-04 ENCOUNTER — APPOINTMENT (OUTPATIENT)
Dept: CARDIOLOGY | Facility: CLINIC | Age: 65
End: 2025-04-04
Payer: MEDICARE

## 2025-04-04 VITALS
HEIGHT: 69 IN | OXYGEN SATURATION: 98 % | SYSTOLIC BLOOD PRESSURE: 98 MMHG | BODY MASS INDEX: 16.29 KG/M2 | HEART RATE: 70 BPM | WEIGHT: 110 LBS | DIASTOLIC BLOOD PRESSURE: 50 MMHG

## 2025-04-04 DIAGNOSIS — I77.9 DISORDER OF ARTERIES AND ARTERIOLES, UNSPECIFIED: ICD-10-CM

## 2025-04-04 DIAGNOSIS — I63.9 CEREBRAL INFARCTION, UNSPECIFIED: ICD-10-CM

## 2025-04-04 DIAGNOSIS — R55 SYNCOPE AND COLLAPSE: ICD-10-CM

## 2025-04-04 DIAGNOSIS — I25.10 ATHEROSCLEROTIC HEART DISEASE OF NATIVE CORONARY ARTERY W/OUT ANGINA PECTORIS: ICD-10-CM

## 2025-04-04 PROCEDURE — G2211 COMPLEX E/M VISIT ADD ON: CPT

## 2025-04-04 PROCEDURE — 93306 TTE W/DOPPLER COMPLETE: CPT

## 2025-04-04 PROCEDURE — 99214 OFFICE O/P EST MOD 30 MIN: CPT

## 2025-09-02 ENCOUNTER — APPOINTMENT (OUTPATIENT)
Dept: CARDIOLOGY | Facility: CLINIC | Age: 65
End: 2025-09-02
Payer: MEDICARE

## 2025-09-02 VITALS
DIASTOLIC BLOOD PRESSURE: 56 MMHG | OXYGEN SATURATION: 95 % | HEIGHT: 69 IN | BODY MASS INDEX: 16.29 KG/M2 | WEIGHT: 110 LBS | HEART RATE: 89 BPM | SYSTOLIC BLOOD PRESSURE: 104 MMHG

## 2025-09-02 DIAGNOSIS — I63.9 CEREBRAL INFARCTION, UNSPECIFIED: ICD-10-CM

## 2025-09-02 DIAGNOSIS — I25.10 ATHEROSCLEROTIC HEART DISEASE OF NATIVE CORONARY ARTERY W/OUT ANGINA PECTORIS: ICD-10-CM

## 2025-09-02 DIAGNOSIS — Z98.890 OTHER SPECIFIED POSTPROCEDURAL STATES: ICD-10-CM

## 2025-09-02 DIAGNOSIS — Z87.898 PERSONAL HISTORY OF OTHER SPECIFIED CONDITIONS: ICD-10-CM

## 2025-09-02 PROCEDURE — G2211 COMPLEX E/M VISIT ADD ON: CPT

## 2025-09-02 PROCEDURE — 93000 ELECTROCARDIOGRAM COMPLETE: CPT

## 2025-09-02 PROCEDURE — 99214 OFFICE O/P EST MOD 30 MIN: CPT

## 2025-09-02 RX ORDER — METFORMIN HYDROCHLORIDE 500 MG/1
500 TABLET, COATED ORAL TWICE DAILY
Refills: 0 | Status: ACTIVE | COMMUNITY